# Patient Record
Sex: MALE | Race: WHITE | Employment: FULL TIME | URBAN - NONMETROPOLITAN AREA
[De-identification: names, ages, dates, MRNs, and addresses within clinical notes are randomized per-mention and may not be internally consistent; named-entity substitution may affect disease eponyms.]

---

## 2017-10-09 ENCOUNTER — HOSPITAL ENCOUNTER (OUTPATIENT)
Age: 27
Discharge: HOME | End: 2017-10-09
Attending: EMERGENCY MEDICINE | Admitting: SURGERY
Payer: COMMERCIAL

## 2017-10-09 ENCOUNTER — ANESTHESIA (OUTPATIENT)
Dept: OPERATING ROOM | Age: 27
End: 2017-10-09
Payer: COMMERCIAL

## 2017-10-09 ENCOUNTER — APPOINTMENT (OUTPATIENT)
Dept: CT IMAGING | Age: 27
End: 2017-10-09
Payer: COMMERCIAL

## 2017-10-09 ENCOUNTER — ANESTHESIA EVENT (OUTPATIENT)
Dept: OPERATING ROOM | Age: 27
End: 2017-10-09
Payer: COMMERCIAL

## 2017-10-09 VITALS — DIASTOLIC BLOOD PRESSURE: 50 MMHG | TEMPERATURE: 97.2 F | SYSTOLIC BLOOD PRESSURE: 100 MMHG | OXYGEN SATURATION: 98 %

## 2017-10-09 VITALS
SYSTOLIC BLOOD PRESSURE: 121 MMHG | HEART RATE: 66 BPM | HEIGHT: 67 IN | TEMPERATURE: 98.6 F | RESPIRATION RATE: 18 BRPM | OXYGEN SATURATION: 100 % | WEIGHT: 149.38 LBS | DIASTOLIC BLOOD PRESSURE: 74 MMHG | BODY MASS INDEX: 23.45 KG/M2

## 2017-10-09 DIAGNOSIS — K35.30 ACUTE APPENDICITIS WITH LOCALIZED PERITONITIS: Primary | ICD-10-CM

## 2017-10-09 LAB
ABSOLUTE EOS #: 0.4 K/UL (ref 0–0.4)
ABSOLUTE LYMPH #: 4.58 K/UL (ref 1–4.8)
ABSOLUTE MONO #: 0.8 K/UL (ref 0–1)
ALBUMIN SERPL-MCNC: 4.3 G/DL (ref 3.5–5.2)
ALBUMIN/GLOBULIN RATIO: 1.3 (ref 1–2.5)
ALP BLD-CCNC: 71 U/L (ref 40–129)
ALT SERPL-CCNC: 202 U/L (ref 5–41)
AMPHETAMINE SCREEN URINE: NEGATIVE
ANION GAP SERPL CALCULATED.3IONS-SCNC: 12 MMOL/L (ref 9–17)
AST SERPL-CCNC: 100 U/L
BARBITURATE SCREEN URINE: NEGATIVE
BASOPHILS # BLD: 0 %
BASOPHILS ABSOLUTE: 0 K/UL (ref 0–0.2)
BENZODIAZEPINE SCREEN, URINE: NEGATIVE
BILIRUB SERPL-MCNC: 0.3 MG/DL (ref 0.3–1.2)
BUN BLDV-MCNC: 12 MG/DL (ref 6–20)
BUN/CREAT BLD: 15 (ref 9–20)
BUPRENORPHINE URINE: NEGATIVE
CALCIUM SERPL-MCNC: 9.1 MG/DL (ref 8.6–10.4)
CANNABINOID SCREEN URINE: NEGATIVE
CHLORIDE BLD-SCNC: 99 MMOL/L (ref 98–107)
CO2: 28 MMOL/L (ref 20–31)
COCAINE METABOLITE, URINE: NEGATIVE
CREAT SERPL-MCNC: 0.78 MG/DL (ref 0.7–1.2)
DIFFERENTIAL TYPE: ABNORMAL
EOSINOPHILS RELATIVE PERCENT: 2 %
ETHANOL PERCENT: <0.01 %
ETHANOL: <10 MG/DL
GFR AFRICAN AMERICAN: >60 ML/MIN
GFR NON-AFRICAN AMERICAN: >60 ML/MIN
GFR SERPL CREATININE-BSD FRML MDRD: ABNORMAL ML/MIN/{1.73_M2}
GFR SERPL CREATININE-BSD FRML MDRD: ABNORMAL ML/MIN/{1.73_M2}
GLUCOSE BLD-MCNC: 107 MG/DL (ref 70–99)
HCT VFR BLD CALC: 50.1 % (ref 41–53)
HEMOGLOBIN: 16.3 G/DL (ref 13.5–17)
LIPASE: 81 U/L (ref 13–60)
LYMPHOCYTES # BLD: 23 %
MCH RBC QN AUTO: 30.3 PG (ref 26–34)
MCHC RBC AUTO-ENTMCNC: 32.6 G/DL (ref 31–37)
MCV RBC AUTO: 93.1 FL (ref 80–100)
MDMA URINE: NORMAL
METHADONE SCREEN, URINE: NEGATIVE
METHAMPHETAMINE, URINE: NEGATIVE
MONOCYTES # BLD: 4 %
MORPHOLOGY: NORMAL
OPIATES, URINE: NEGATIVE
OXYCODONE SCREEN URINE: NEGATIVE
PDW BLD-RTO: 13.6 % (ref 12.1–15.2)
PHENCYCLIDINE, URINE: NEGATIVE
PLATELET # BLD: 284 K/UL (ref 140–450)
PLATELET ESTIMATE: ABNORMAL
PMV BLD AUTO: 7.7 FL (ref 6–12)
POTASSIUM SERPL-SCNC: 4 MMOL/L (ref 3.7–5.3)
PROPOXYPHENE, URINE: NEGATIVE
RBC # BLD: 5.38 M/UL (ref 4.5–5.9)
RBC # BLD: ABNORMAL 10*6/UL
SEG NEUTROPHILS: 71 %
SEGMENTED NEUTROPHILS ABSOLUTE COUNT: 14.12 K/UL (ref 1.8–7.7)
SODIUM BLD-SCNC: 139 MMOL/L (ref 135–144)
TEST INFORMATION: NORMAL
TOTAL PROTEIN: 7.5 G/DL (ref 6.4–8.3)
TRICYCLIC ANTIDEPRESSANTS, UR: NEGATIVE
WBC # BLD: 19.9 K/UL (ref 3.5–11)
WBC # BLD: ABNORMAL 10*3/UL

## 2017-10-09 PROCEDURE — 80306 DRUG TEST PRSMV INSTRMNT: CPT

## 2017-10-09 PROCEDURE — 3700000001 HC ADD 15 MINUTES (ANESTHESIA): Performed by: SURGERY

## 2017-10-09 PROCEDURE — 6370000000 HC RX 637 (ALT 250 FOR IP): Performed by: EMERGENCY MEDICINE

## 2017-10-09 PROCEDURE — 83690 ASSAY OF LIPASE: CPT

## 2017-10-09 PROCEDURE — 2720000003 HC MISC SUTURE/STAPLES/RELOADS/ETC: Performed by: SURGERY

## 2017-10-09 PROCEDURE — 6360000002 HC RX W HCPCS: Performed by: EMERGENCY MEDICINE

## 2017-10-09 PROCEDURE — 2580000003 HC RX 258: Performed by: EMERGENCY MEDICINE

## 2017-10-09 PROCEDURE — 74177 CT ABD & PELVIS W/CONTRAST: CPT

## 2017-10-09 PROCEDURE — 3600000014 HC SURGERY LEVEL 4 ADDTL 15MIN: Performed by: SURGERY

## 2017-10-09 PROCEDURE — 7100000000 HC PACU RECOVERY - FIRST 15 MIN: Performed by: SURGERY

## 2017-10-09 PROCEDURE — 6360000002 HC RX W HCPCS: Performed by: SURGERY

## 2017-10-09 PROCEDURE — 7100000001 HC PACU RECOVERY - ADDTL 15 MIN: Performed by: SURGERY

## 2017-10-09 PROCEDURE — 80053 COMPREHEN METABOLIC PANEL: CPT

## 2017-10-09 PROCEDURE — 2500000003 HC RX 250 WO HCPCS

## 2017-10-09 PROCEDURE — 2500000003 HC RX 250 WO HCPCS: Performed by: NURSE ANESTHETIST, CERTIFIED REGISTERED

## 2017-10-09 PROCEDURE — 3600000004 HC SURGERY LEVEL 4 BASE: Performed by: SURGERY

## 2017-10-09 PROCEDURE — 85025 COMPLETE CBC W/AUTO DIFF WBC: CPT

## 2017-10-09 PROCEDURE — 99219 PR INITIAL OBSERVATION CARE/DAY 50 MINUTES: CPT | Performed by: SURGERY

## 2017-10-09 PROCEDURE — 99285 EMERGENCY DEPT VISIT HI MDM: CPT

## 2017-10-09 PROCEDURE — 96375 TX/PRO/DX INJ NEW DRUG ADDON: CPT

## 2017-10-09 PROCEDURE — 96365 THER/PROPH/DIAG IV INF INIT: CPT

## 2017-10-09 PROCEDURE — 6360000002 HC RX W HCPCS: Performed by: NURSE ANESTHETIST, CERTIFIED REGISTERED

## 2017-10-09 PROCEDURE — 6360000004 HC RX CONTRAST MEDICATION: Performed by: EMERGENCY MEDICINE

## 2017-10-09 PROCEDURE — 2720000010 HC SURG SUPPLY STERILE: Performed by: SURGERY

## 2017-10-09 PROCEDURE — 88304 TISSUE EXAM BY PATHOLOGIST: CPT

## 2017-10-09 PROCEDURE — 2580000003 HC RX 258: Performed by: SURGERY

## 2017-10-09 PROCEDURE — 3700000000 HC ANESTHESIA ATTENDED CARE: Performed by: SURGERY

## 2017-10-09 PROCEDURE — G0480 DRUG TEST DEF 1-7 CLASSES: HCPCS

## 2017-10-09 RX ORDER — IBUPROFEN 600 MG/1
600 TABLET ORAL EVERY 6 HOURS PRN
Status: DISCONTINUED | OUTPATIENT
Start: 2017-10-09 | End: 2017-10-09 | Stop reason: HOSPADM

## 2017-10-09 RX ORDER — SODIUM CHLORIDE, SODIUM LACTATE, POTASSIUM CHLORIDE, CALCIUM CHLORIDE 600; 310; 30; 20 MG/100ML; MG/100ML; MG/100ML; MG/100ML
INJECTION, SOLUTION INTRAVENOUS CONTINUOUS
Status: DISCONTINUED | OUTPATIENT
Start: 2017-10-09 | End: 2017-10-09

## 2017-10-09 RX ORDER — OXYCODONE HYDROCHLORIDE AND ACETAMINOPHEN 5; 325 MG/1; MG/1
2 TABLET ORAL PRN
Status: DISCONTINUED | OUTPATIENT
Start: 2017-10-09 | End: 2017-10-09 | Stop reason: HOSPADM

## 2017-10-09 RX ORDER — FENTANYL CITRATE 50 UG/ML
INJECTION, SOLUTION INTRAMUSCULAR; INTRAVENOUS PRN
Status: DISCONTINUED | OUTPATIENT
Start: 2017-10-09 | End: 2017-10-09 | Stop reason: SDUPTHER

## 2017-10-09 RX ORDER — SUCCINYLCHOLINE CHLORIDE 20 MG/ML
INJECTION INTRAMUSCULAR; INTRAVENOUS PRN
Status: DISCONTINUED | OUTPATIENT
Start: 2017-10-09 | End: 2017-10-09 | Stop reason: SDUPTHER

## 2017-10-09 RX ORDER — OXYCODONE HYDROCHLORIDE AND ACETAMINOPHEN 5; 325 MG/1; MG/1
1 TABLET ORAL PRN
Status: DISCONTINUED | OUTPATIENT
Start: 2017-10-09 | End: 2017-10-09 | Stop reason: HOSPADM

## 2017-10-09 RX ORDER — ACETAMINOPHEN 325 MG/1
650 TABLET ORAL EVERY 6 HOURS PRN
Status: DISCONTINUED | OUTPATIENT
Start: 2017-10-09 | End: 2017-10-09 | Stop reason: HOSPADM

## 2017-10-09 RX ORDER — FENTANYL CITRATE 50 UG/ML
50 INJECTION, SOLUTION INTRAMUSCULAR; INTRAVENOUS EVERY 5 MIN PRN
Status: DISCONTINUED | OUTPATIENT
Start: 2017-10-09 | End: 2017-10-09 | Stop reason: HOSPADM

## 2017-10-09 RX ORDER — GLYCOPYRROLATE 0.2 MG/ML
INJECTION INTRAMUSCULAR; INTRAVENOUS PRN
Status: DISCONTINUED | OUTPATIENT
Start: 2017-10-09 | End: 2017-10-09 | Stop reason: SDUPTHER

## 2017-10-09 RX ORDER — DICYCLOMINE HYDROCHLORIDE 10 MG/1
20 CAPSULE ORAL ONCE
Status: COMPLETED | OUTPATIENT
Start: 2017-10-09 | End: 2017-10-09

## 2017-10-09 RX ORDER — SODIUM CHLORIDE 9 MG/ML
INJECTION, SOLUTION INTRAVENOUS CONTINUOUS
Status: DISCONTINUED | OUTPATIENT
Start: 2017-10-09 | End: 2017-10-09

## 2017-10-09 RX ORDER — MEPERIDINE HYDROCHLORIDE 50 MG/ML
12.5 INJECTION INTRAMUSCULAR; INTRAVENOUS; SUBCUTANEOUS EVERY 5 MIN PRN
Status: DISCONTINUED | OUTPATIENT
Start: 2017-10-09 | End: 2017-10-09 | Stop reason: HOSPADM

## 2017-10-09 RX ORDER — KETOROLAC TROMETHAMINE 10 MG/1
10 TABLET, FILM COATED ORAL EVERY 6 HOURS PRN
Qty: 30 TABLET | Refills: 0 | Status: SHIPPED | OUTPATIENT
Start: 2017-10-09 | End: 2018-10-09

## 2017-10-09 RX ORDER — KETOROLAC TROMETHAMINE 30 MG/ML
30 INJECTION, SOLUTION INTRAMUSCULAR; INTRAVENOUS EVERY 6 HOURS PRN
Status: DISCONTINUED | OUTPATIENT
Start: 2017-10-09 | End: 2017-10-09 | Stop reason: HOSPADM

## 2017-10-09 RX ORDER — MIDAZOLAM HYDROCHLORIDE 1 MG/ML
2 INJECTION INTRAMUSCULAR; INTRAVENOUS ONCE
Status: COMPLETED | OUTPATIENT
Start: 2017-10-09 | End: 2017-10-09

## 2017-10-09 RX ORDER — ONDANSETRON 2 MG/ML
4 INJECTION INTRAMUSCULAR; INTRAVENOUS
Status: DISCONTINUED | OUTPATIENT
Start: 2017-10-09 | End: 2017-10-09 | Stop reason: HOSPADM

## 2017-10-09 RX ORDER — ONDANSETRON 4 MG/1
4 TABLET, ORALLY DISINTEGRATING ORAL EVERY 8 HOURS PRN
Qty: 30 TABLET | Refills: 0 | Status: SHIPPED | OUTPATIENT
Start: 2017-10-09

## 2017-10-09 RX ORDER — IBUPROFEN 200 MG
600 TABLET ORAL EVERY 6 HOURS PRN
COMMUNITY

## 2017-10-09 RX ORDER — ROCURONIUM BROMIDE 10 MG/ML
INJECTION, SOLUTION INTRAVENOUS PRN
Status: DISCONTINUED | OUTPATIENT
Start: 2017-10-09 | End: 2017-10-09 | Stop reason: SDUPTHER

## 2017-10-09 RX ORDER — 0.9 % SODIUM CHLORIDE 0.9 %
1000 INTRAVENOUS SOLUTION INTRAVENOUS ONCE
Status: COMPLETED | OUTPATIENT
Start: 2017-10-09 | End: 2017-10-09

## 2017-10-09 RX ORDER — METOCLOPRAMIDE HYDROCHLORIDE 5 MG/ML
10 INJECTION INTRAMUSCULAR; INTRAVENOUS
Status: DISCONTINUED | OUTPATIENT
Start: 2017-10-09 | End: 2017-10-09 | Stop reason: HOSPADM

## 2017-10-09 RX ORDER — PROPOFOL 10 MG/ML
INJECTION, EMULSION INTRAVENOUS PRN
Status: DISCONTINUED | OUTPATIENT
Start: 2017-10-09 | End: 2017-10-09 | Stop reason: SDUPTHER

## 2017-10-09 RX ORDER — ONDANSETRON 2 MG/ML
4 INJECTION INTRAMUSCULAR; INTRAVENOUS ONCE
Status: COMPLETED | OUTPATIENT
Start: 2017-10-09 | End: 2017-10-09

## 2017-10-09 RX ORDER — ONDANSETRON 2 MG/ML
INJECTION INTRAMUSCULAR; INTRAVENOUS PRN
Status: DISCONTINUED | OUTPATIENT
Start: 2017-10-09 | End: 2017-10-09 | Stop reason: SDUPTHER

## 2017-10-09 RX ORDER — ACETAMINOPHEN 10 MG/ML
INJECTION, SOLUTION INTRAVENOUS PRN
Status: DISCONTINUED | OUTPATIENT
Start: 2017-10-09 | End: 2017-10-09 | Stop reason: SDUPTHER

## 2017-10-09 RX ORDER — LIDOCAINE HYDROCHLORIDE 20 MG/ML
INJECTION, SOLUTION INFILTRATION; PERINEURAL PRN
Status: DISCONTINUED | OUTPATIENT
Start: 2017-10-09 | End: 2017-10-09 | Stop reason: SDUPTHER

## 2017-10-09 RX ORDER — KETOROLAC TROMETHAMINE 30 MG/ML
INJECTION, SOLUTION INTRAMUSCULAR; INTRAVENOUS PRN
Status: DISCONTINUED | OUTPATIENT
Start: 2017-10-09 | End: 2017-10-09 | Stop reason: SDUPTHER

## 2017-10-09 RX ADMIN — PIPERACILLIN SODIUM,TAZOBACTAM SODIUM 3.38 G: 3; .375 INJECTION, POWDER, FOR SOLUTION INTRAVENOUS at 05:59

## 2017-10-09 RX ADMIN — SODIUM CHLORIDE, POTASSIUM CHLORIDE, SODIUM LACTATE AND CALCIUM CHLORIDE 125 ML/HR: 600; 310; 30; 20 INJECTION, SOLUTION INTRAVENOUS at 06:29

## 2017-10-09 RX ADMIN — PIPERACILLIN SODIUM,TAZOBACTAM SODIUM 3.38 G: 3; .375 INJECTION, POWDER, FOR SOLUTION INTRAVENOUS at 17:46

## 2017-10-09 RX ADMIN — MIDAZOLAM HYDROCHLORIDE 2 MG: 2 INJECTION, SOLUTION INTRAMUSCULAR; INTRAVENOUS at 07:29

## 2017-10-09 RX ADMIN — DICYCLOMINE HYDROCHLORIDE 20 MG: 10 CAPSULE ORAL at 04:47

## 2017-10-09 RX ADMIN — GLYCOPYRROLATE 0.4 MG: 0.2 INJECTION INTRAMUSCULAR; INTRAVENOUS at 08:47

## 2017-10-09 RX ADMIN — IOPAMIDOL 100 ML: 612 INJECTION, SOLUTION INTRAVENOUS at 05:15

## 2017-10-09 RX ADMIN — SUCCINYLCHOLINE CHLORIDE 100 MG: 20 INJECTION, SOLUTION INTRAMUSCULAR; INTRAVENOUS at 07:54

## 2017-10-09 RX ADMIN — PROPOFOL 200 MG: 10 INJECTION, EMULSION INTRAVENOUS at 07:54

## 2017-10-09 RX ADMIN — ONDANSETRON 4 MG: 2 INJECTION INTRAMUSCULAR; INTRAVENOUS at 04:45

## 2017-10-09 RX ADMIN — PIPERACILLIN SODIUM,TAZOBACTAM SODIUM 3.38 G: 3; .375 INJECTION, POWDER, FOR SOLUTION INTRAVENOUS at 12:16

## 2017-10-09 RX ADMIN — ONDANSETRON 4 MG: 2 INJECTION INTRAMUSCULAR; INTRAVENOUS at 08:47

## 2017-10-09 RX ADMIN — KETOROLAC TROMETHAMINE 30 MG: 30 INJECTION, SOLUTION INTRAMUSCULAR at 12:59

## 2017-10-09 RX ADMIN — LIDOCAINE HYDROCHLORIDE 100 MG: 20 INJECTION, SOLUTION INFILTRATION; PERINEURAL at 07:54

## 2017-10-09 RX ADMIN — Medication 25 MG: at 08:05

## 2017-10-09 RX ADMIN — NEOSTIGMINE METHYLSULFATE 2 MG: 1 INJECTION, SOLUTION INTRAMUSCULAR; INTRAVENOUS; SUBCUTANEOUS at 08:47

## 2017-10-09 RX ADMIN — KETOROLAC TROMETHAMINE 30 MG: 30 INJECTION, SOLUTION INTRAMUSCULAR; INTRAVENOUS at 08:47

## 2017-10-09 RX ADMIN — SODIUM CHLORIDE 1000 ML: 9 INJECTION, SOLUTION INTRAVENOUS at 04:43

## 2017-10-09 RX ADMIN — ACETAMINOPHEN 1000 MG: 10 INJECTION, SOLUTION INTRAVENOUS at 08:10

## 2017-10-09 RX ADMIN — FENTANYL CITRATE 100 MCG: 50 INJECTION INTRAMUSCULAR; INTRAVENOUS at 07:54

## 2017-10-09 ASSESSMENT — ENCOUNTER SYMPTOMS
EYE REDNESS: 0
CHEST TIGHTNESS: 0
RESPIRATORY NEGATIVE: 1
SINUS PRESSURE: 0
VOMITING: 0
EYE PAIN: 0
FACIAL SWELLING: 0
WHEEZING: 0
DIARRHEA: 1
SHORTNESS OF BREATH: 0
CONSTIPATION: 0
NAUSEA: 1
BLOOD IN STOOL: 0
ABDOMINAL PAIN: 1
COUGH: 0
EYES NEGATIVE: 1

## 2017-10-09 ASSESSMENT — PAIN SCALES - GENERAL
PAINLEVEL_OUTOF10: 2
PAINLEVEL_OUTOF10: 2
PAINLEVEL_OUTOF10: 6
PAINLEVEL_OUTOF10: 3
PAINLEVEL_OUTOF10: 1
PAINLEVEL_OUTOF10: 0
PAINLEVEL_OUTOF10: 3
PAINLEVEL_OUTOF10: 0
PAINLEVEL_OUTOF10: 2
PAINLEVEL_OUTOF10: 9
PAINLEVEL_OUTOF10: 1

## 2017-10-09 ASSESSMENT — PAIN DESCRIPTION - LOCATION
LOCATION: ABDOMEN

## 2017-10-09 ASSESSMENT — PAIN DESCRIPTION - FREQUENCY
FREQUENCY: INTERMITTENT
FREQUENCY: INTERMITTENT

## 2017-10-09 ASSESSMENT — PAIN DESCRIPTION - ORIENTATION
ORIENTATION: LOWER
ORIENTATION: MID
ORIENTATION: LOWER

## 2017-10-09 ASSESSMENT — PAIN DESCRIPTION - PAIN TYPE
TYPE: SURGICAL PAIN
TYPE: SURGICAL PAIN
TYPE: ACUTE PAIN
TYPE: SURGICAL PAIN

## 2017-10-09 ASSESSMENT — PAIN DESCRIPTION - DESCRIPTORS
DESCRIPTORS: HEAVINESS;DISCOMFORT
DESCRIPTORS: ACHING
DESCRIPTORS: SHARP;STABBING

## 2017-10-09 ASSESSMENT — PAIN DESCRIPTION - ONSET
ONSET: ON-GOING
ONSET: ON-GOING

## 2017-10-09 NOTE — ANESTHESIA POSTPROCEDURE EVALUATION
Department of Anesthesiology  Postprocedure Note    Patient: Ruth Perry  MRN: [id number]  [place]: 1990  Date of evaluation: 10/9/2017  Time:  9:40 AM     Procedure Summary     Date:  10/09/17 Room / Location:  [address] [place] [address]    Anesthesia Start:  0749 Anesthesia Stop:  9628    Procedure:  APPENDECTOMY LAPAROSCOPIC (N/A ) Diagnosis:  (ABD PAIN)    Surgeon:  Dario Giron DO Responsible Provider:  Daniel Alex CRNA    Anesthesia Type:  general, regional ASA Status:  Not recorded          Anesthesia Type: general, regional    Len Phase I: Len Score: 10    Len Phase II:      Last vitals: Reviewed and per EMR flowsheets.        Anesthesia Post Evaluation    Patient location during evaluation: PACU  Patient participation: complete - patient participated  Level of consciousness: awake  Pain score: 0  Airway patency: patent  Nausea & Vomiting: no nausea and no vomiting  Complications: no  Cardiovascular status: blood pressure returned to baseline  Respiratory status: acceptable  Hydration status: euvolemic

## 2017-10-09 NOTE — PROGRESS NOTES
Discharge instructions given to pt and significant other. No questions. Pt aware to make follow up appointment as instructed on AVS. Pt d/c'd off unit at this time, ambulatory with significant other to home. Belongings in hand. No issues noted.

## 2017-10-09 NOTE — ED PROVIDER NOTES
[facility] ED  eMERGENCY dEPARTMENT eNCOUnter      Pt Name: Alla Coronado  MRN: [id number]  [place] 1990  Date of evaluation: 10/9/2017  Provider: Benitez Mcgill MD    CHIEF COMPLAINT       Chief Complaint   Patient presents with    Abdominal Pain     mid abdomen, onset 3 hours ago       [facility]    Alla Coronado is a 32 y.o. male who presents to the emergency department from home For epigastric pain that began this evening. States began 3 hours ago while he was laying down. The pain is sharp and stabbing. It does not radiate. Associated with nausea and diarrhea. Has had 4 episodes of watery diarrhea. No sick contacts. Has not taken any medications for pain. Patient denies fever, chills, headache, visual changes, neck pain, chest pain, lightheadedness, shortness of breath, or dysuria. Triage notes and Nursing notes were reviewed by myself. Any discrepancies are addressed above. PAST MEDICAL HISTORY     Past Medical History:   Diagnosis Date    Asthma        SURGICAL HISTORY     History reviewed. No pertinent surgical history. CURRENT MEDICATIONS       Previous Medications    ALBUTEROL SULFATE  (90 BASE) MCG/ACT INHALER    Inhale 2 puffs into the lungs every 6 hours as needed for Wheezing or Shortness of Breath    IBUPROFEN (ADVIL;MOTRIN) 200 MG TABLET    Take 600 mg by mouth every 6 hours as needed for Pain    OMEPRAZOLE (PRILOSEC) 20 MG CAPSULE    Take 1 capsule by mouth every 12 hours for 14 days       ALLERGIES     Review of patient's allergies indicates no known allergies. FAMILY HISTORY     History reviewed. No pertinent family history.      SOCIAL HISTORY       Social History     Social History    Marital status: Single     Spouse name: N/A    Number of children: N/A    Years of education: N/A     Social History Main Topics    Smoking status: Current Every Day Smoker     Packs/day: 1.00     Types: Cigarettes    Smokeless tobacco: Never Used    Alcohol use No    Drug use: No      Comment: former herion user    Sexual activity: Not Asked     Other Topics Concern    None     Social History Narrative    None       REVIEW OF SYSTEMS       Review of Systems   Constitutional: Negative. Negative for chills and fever. HENT: Negative. Negative for congestion, facial swelling and sinus pressure. Eyes: Negative. Negative for pain and redness. Respiratory: Negative. Negative for cough, chest tightness, shortness of breath and wheezing. Cardiovascular: Negative. Negative for chest pain and leg swelling. Gastrointestinal: Positive for abdominal pain, diarrhea and nausea. Negative for blood in stool, constipation and vomiting. Genitourinary: Negative. Negative for dysuria and hematuria. Musculoskeletal: Negative. Negative for arthralgias and neck pain. Skin: Negative. Negative for pallor and wound. Neurological: Negative. Negative for dizziness, light-headedness, numbness and headaches. Psychiatric/Behavioral: Negative. Negative for suicidal ideas. All other systems reviewed and are negative. Except as noted above the remainder of the review of systems was reviewed and is negative. PHYSICAL EXAM    (up to 7 for level 4, 8 or more for level 5)     ED Triage Vitals [10/09/17 0417]   BP Temp Temp Source Pulse Resp SpO2 Height Weight   133/79 98.6 °F (37 °C) Tympanic 81 18 99 % 5' 7\" (1.702 m) 145 lb (65.8 kg)       Physical Exam   Constitutional: He is oriented to person, place, and time. He appears well-developed and well-nourished. No distress. HENT:   Head: Normocephalic and atraumatic. Mouth/Throat: Oropharynx is clear and moist.   Eyes: Conjunctivae and EOM are normal.   Neck: Normal range of motion. Neck supple. Cardiovascular: Normal rate, regular rhythm and normal heart sounds. No murmur heard. Pulmonary/Chest: Effort normal and breath sounds normal. No respiratory distress. He has no wheezes.  He exhibits no tenderness. Abdominal: Soft. Bowel sounds are normal. There is tenderness (Epigastric tenderness to palpation. Mild involuntary guarding. Negative Hilario's. No right lower quadrant pain. ). There is guarding. There is no rebound. Musculoskeletal: Normal range of motion. He exhibits no edema, tenderness or deformity. Neurological: He is alert and oriented to person, place, and time. No cranial nerve deficit. No focal neurological deficits   Skin: Skin is warm. Nursing note and vitals reviewed. DIAGNOSTIC RESULTS     EKG: (none if blank)  All EKG's are interpreted by the Emergency Department Physician who either signs or Co-signs this chart in the absence of a cardiologist.        RADIOLOGY: (none if blank)   Interpretation per the Radiologist below, if available at the time of this note:    CT ABDOMEN PELVIS W IV CONTRAST Additional Contrast? None   Final Result   Impression:     Acute appendicitis without evidence of perforation. Dr. Rhonda Brumfield discussed findings with Nurse Lazara Duran at 4514 CST on 10/09/2017 immediately following the examination. Electronically Signed By: Ritesh Patterson   on  10/09/2017  05:35          LABS:  Labs Reviewed   CBC WITH AUTO DIFFERENTIAL - Abnormal; Notable for the following:        Result Value    WBC 19.9 (*)     Segs Absolute 14.12 (*)     All other components within normal limits   COMPREHENSIVE METABOLIC PANEL - Abnormal; Notable for the following:     Glucose 107 (*)      (*)      (*)     All other components within normal limits   LIPASE - Abnormal; Notable for the following:     Lipase 81 (*)     All other components within normal limits   ETHANOL   URINE DRUG SCREEN       All other labs were within normal range or not returned as of this dictation. EMERGENCY DEPARTMENT COURSE and Medical Decision Making:     MDM/  ED Course      The patient presents with epigastric abdominal pain.  The patient is afebrile, nontoxic, and

## 2017-10-09 NOTE — OP NOTE
[address], [place] [address]                               OPERATIVE REPORT    PATIENT NAME: Allan Castrejon                        :             1990  MED REC NO:   [id number]                               ROOM:            0320  ACCOUNT NO:   [de-identified]                            ADMISSION DATE:  10/09/2017  PROVIDER:     Rosalva Earing    DATE OF PROCEDURE:  10/09/2017    PREOPERATIVE DIAGNOSIS:  Acute appendicitis. POSTOPERATIVE DIAGNOSIS:  Acute appendicitis. OPERATION PERFORMED:  Laparoscopic appendectomy. SURGEON:  Dr. Jose Nations:  General.    ESTIMATED BLOOD LOSS:  Minimal.    COMPLICATIONS:  None. SPECIMEN:  Appendix. FINDINGS:  An inflamed appendix. INDICATIONS:  This is a 55-year-old male who presented to the  emergency room with less than 12 hours worth of periumbilical pain. CT scan obtained, which showed acute appendicitis. Consent was  obtained for laparoscopic appendectomy with risks, benefits, and  alternatives to procedure were explained. OPERATIVE PROCEDURE:  The patient was brought into the operating room  table, laid in the supine position, perioperative antibiotics and SCDs  placed. Surgical pause performed. Appropriate anesthesia was  administered. A TAP block was administered by Anesthesia. The  abdomen was prepped and draped in the usual fashion with ChloraPrep. An incision was made periumbilical to insert a 38-PD port, 15 mmHg  insufflation. Two other 5-mm ports were placed under direct  visualization. The appendix was identified. LigaSure was used to  take down the mesoappendix and the appendix was taken down with Blue  load Endo TERRENCE and removed via the 10-mm port via an Endo Catch bag. Hemostasis was satisfactory. Ports were removed under direct  visualization. The 10-mm port fascia was closed with a  figure-of-eight Vicryl suture.   The skin was

## 2017-10-09 NOTE — ANESTHESIA PRE PROCEDURE
Department of Anesthesiology  Preprocedure Note       Name:  Ruth Perry   Age:  32 y.o.  :  1990                                          MRN:  [id number]         Date:  10/9/2017      Surgeon: Rebeca Wilson):  Saima Nguyen DO    Procedure: Procedure(s):  APPENDECTOMY LAPAROSCOPIC    Medications prior to admission:   Prior to Admission medications    Medication Sig Start Date End Date Taking? Authorizing Provider   ibuprofen (ADVIL;MOTRIN) 200 MG tablet Take 600 mg by mouth every 6 hours as needed for Pain   Yes Historical Provider, MD   albuterol sulfate  (90 BASE) MCG/ACT inhaler Inhale 2 puffs into the lungs every 6 hours as needed for Wheezing or Shortness of Breath    Historical Provider, MD   omeprazole (PRILOSEC) 20 MG capsule Take 1 capsule by mouth every 12 hours for 14 days 12/11/15 12/25/15  Tere Torres DO       Current medications:    Current Facility-Administered Medications   Medication Dose Route Frequency Provider Last Rate Last Dose    [MAR Hold] lactated ringers infusion   Intravenous Continuous Usman Lorenz  mL/hr at 10/09/17 0629 125 mL/hr at 10/09/17 06       Allergies:  No Known Allergies    Problem List:  There is no problem list on file for this patient. Past Medical History:        Diagnosis Date    Asthma        Past Surgical History:  History reviewed. No pertinent surgical history.     Social History:    Social History   Substance Use Topics    Smoking status: Current Every Day Smoker     Packs/day: 1.00     Types: Cigarettes    Smokeless tobacco: Never Used    Alcohol use No                                Ready to quit: Not Answered  Counseling given: Not Answered      Vital Signs (Current):   Vitals:    10/09/17 0542 10/09/17 0607 10/09/17 0621 10/09/17 0641   BP: 107/62 (!) 109/59 108/62 (!) 111/59   Pulse:       Resp:       Temp:       TempSrc:       SpO2:       Weight:       Height:                                                  BP Readings from Last 3 Encounters:   10/09/17 (!) 111/59   12/11/15 136/82   09/04/15 118/71       NPO Status: Time of last liquid consumption: 0330                        Time of last solid consumption: 2100                        Date of last liquid consumption: 10/09/17                        Date of last solid food consumption: 10/08/17    BMI:   Wt Readings from Last 3 Encounters:   10/09/17 145 lb (65.8 kg)   12/11/15 154 lb 9 oz (70.1 kg)   09/04/15 140 lb (63.5 kg)     Body mass index is 22.71 kg/m². CBC:   Lab Results   Component Value Date    WBC 19.9 10/09/2017    RBC 5.38 10/09/2017    HGB 16.3 10/09/2017    HCT 50.1 10/09/2017    MCV 93.1 10/09/2017    RDW 13.6 10/09/2017     10/09/2017       CMP:   Lab Results   Component Value Date     10/09/2017    K 4.0 10/09/2017    CL 99 10/09/2017    CO2 28 10/09/2017    BUN 12 10/09/2017    CREATININE 0.78 10/09/2017    GFRAA >60 10/09/2017    LABGLOM >60 10/09/2017    GLUCOSE 107 10/09/2017    PROT 7.5 10/09/2017    CALCIUM 9.1 10/09/2017    BILITOT 0.30 10/09/2017    ALKPHOS 71 10/09/2017     10/09/2017     10/09/2017       POC Tests: No results for input(s): POCGLU, POCNA, POCK, POCCL, POCBUN, POCHEMO, POCHCT in the last 72 hours.     Coags: No results found for: PROTIME, INR, APTT    HCG (If Applicable): No results found for: PREGTESTUR, PREGSERUM, HCG, HCGQUANT     ABGs: No results found for: PHART, PO2ART, ODN0TAM, RCM6NFJ, BEART, K9WTZVLT     Type & Screen (If Applicable):  No results found for: LABABO, LABRH    Anesthesia Evaluation    Airway: Mallampati: II  TM distance: >3 FB   Neck ROM: full  Mouth opening: > = 3 FB Dental:          Pulmonary: breath sounds clear to auscultation  (+) asthma:                            Cardiovascular:            Rhythm: regular  Rate: normal           Beta Blocker:  Not on Beta Blocker         Neuro/Psych:               GI/Hepatic/Renal:             Endo/Other:                     Abdominal:       Abdomen: soft.    Vascular:                                    Anesthesia Plan      general and regional     ASA 2 - emergent       Induction: intravenous. MIPS: Postoperative opioids intended and Prophylactic antiemetics administered. Anesthetic plan and risks discussed with patient. Plan discussed with CRNA.                   Frederick Sandoval CRNA   42/7/1428

## 2017-10-09 NOTE — H&P
Surgical H&P  10/9/2017 7:01 AM    PCP: No primary care provider on file. HPI  27M  Presented with periumbilical abdominal pain <12hours. Pain is moderate and associated with nausea/emesis/diarrhea and described as crampy. CT shows acute appendicitis. Past Medical History:        Diagnosis Date    Asthma        Past Surgical History:    History reviewed. No pertinent surgical history. Medications:    Prior to Admission medications    Medication Sig Start Date End Date Taking? Authorizing Provider   ibuprofen (ADVIL;MOTRIN) 200 MG tablet Take 600 mg by mouth every 6 hours as needed for Pain   Yes Historical Provider, MD   albuterol sulfate  (90 BASE) MCG/ACT inhaler Inhale 2 puffs into the lungs every 6 hours as needed for Wheezing or Shortness of Breath    Historical Provider, MD   omeprazole (PRILOSEC) 20 MG capsule Take 1 capsule by mouth every 12 hours for 14 days 12/11/15 12/25/15  Reynaldo Robb DO       Allergies:  Review of patient's allergies indicates no known allergies. Social History:   TOBACCO:   reports that he has been smoking Cigarettes. He has been smoking about 1.00 pack per day. He has never used smokeless tobacco.  ETOH:   reports that he does not drink alcohol. Family History:   History reviewed. No pertinent family history.       ROS  Constitutional:  Denies fever or chills   Eyes:  Denies change in visual acuity   HENT:  Denies nasal congestion or sore throat   Respiratory:  Denies cough or shortness of breath   Cardiovascular:  Denies chest pain or edema   GI:  See HPI  :  Denies dysuria   Musculoskeletal:  Denies back pain or joint pain   Integument:  Denies rash   Neurologic:  Denies headache, focal weakness or sensory changes   Endocrine:  Denies polyuria or polydipsia   Lymphatic:  Denies swollen glands   Psychiatric:  Denies depression or anxiety       Physical Exam  BP (!) 111/59   Pulse 81   Temp 98.6 °F (37 °C) (Tympanic)   Resp 18   Ht 5' 7\" (1.702 intrathoracic contents are remarkable for right basilar linear atelectasis/scarring.        The liver, gallbladder, pancreas, spleen, and adrenal glands are unremarkable.        Kidneys show no worrisome lesions, hydronephrosis, or calculi. Urinary bladder is unremarkable.        Small and large bowel are normal in caliber. Appendix measures up to 8 millimeters in caliber, is fluid-filled, and demonstrates periappendiceal stranding and edema. No free air, free fluid, or lymphadenopathy identified.       Aorta is normal in course and caliber.       Superficial soft tissues are unremarkable.  No acute or aggressive appearing skeletal findings.           Impression   Impression:     Acute appendicitis without evidence of perforation.        Dr. Kwaku Nix discussed findings with Nurse Kelsey Lyon at 5754 CST on 10/09/2017 immediately following the examination.           Electronically Signed By: Bishop Hook   on  10/09/2017  05:35         Assessment and Plan  Acute appendicitis- laparoscopic appendectomy. Risk/benefits/alternatives explained.     Dr. Javier Pierce

## 2017-10-09 NOTE — ANESTHESIA PROCEDURE NOTES
Peripheral Block    Patient location during procedure: OR  Start time: 10/9/2017 7:58 AM  End time: 10/9/2017 8:02 AM  Staffing  Resident/CRNA: Rebeca BHAT  Preanesthetic Checklist  Completed: patient identified, surgical consent, pre-op evaluation, timeout performed, IV checked, risks and benefits discussed, monitors and equipment checked, anesthesia consent given, oxygen available and patient being monitored  Peripheral Block  Patient position: supine  Prep: ChloraPrep  Patient monitoring: cardiac monitor, continuous pulse ox, continuous capnometry, frequent blood pressure checks and IV access  Block type: TAP  Laterality: bilateral  Injection technique: single-shot  Procedures: ultrasound guided  Local infiltration: ropivacaine (decardron 10mg total)  Infiltration strength: 0.5 %  Dose: 40 mL  Provider prep: mask and sterile gloves  Local infiltration: ropivacaine (decardron 10mg total)  Needle  Needle type: pajunk 80mm TAP.    Assessment  Injection assessment: negative aspiration for heme, no paresthesia on injection and local visualized surrounding nerve on ultrasound  Paresthesia pain: none  Slow fractionated injection: yes  Hemodynamics: stable  Reason for block: post-op pain management and at surgeon's request

## 2017-10-09 NOTE — BRIEF OP NOTE
Brief Postoperative Note    Татьяна Beck  YOB: 1990  [id number]    Pre-operative Diagnosis: acute appendicitis    Post-operative Diagnosis: Same    Procedure: laparoscopic appendectomy    Anesthesia: General    Surgeons/Assistants: Wendy    Estimated Blood Loss: less than 50     Complications: None    Specimens: Was Obtained: appendix    Findings: inflamed appendix    Electronically signed by Oliver Calderon DO on 10/9/2017 at 9:01 AM  [id number]

## 2017-10-10 LAB — SURGICAL PATHOLOGY REPORT: NORMAL

## 2017-10-17 ENCOUNTER — OFFICE VISIT (OUTPATIENT)
Dept: SURGERY | Age: 27
End: 2017-10-17

## 2017-10-17 VITALS
HEIGHT: 67 IN | WEIGHT: 151.1 LBS | DIASTOLIC BLOOD PRESSURE: 83 MMHG | TEMPERATURE: 99.1 F | RESPIRATION RATE: 16 BRPM | HEART RATE: 88 BPM | BODY MASS INDEX: 23.72 KG/M2 | SYSTOLIC BLOOD PRESSURE: 134 MMHG

## 2017-10-17 DIAGNOSIS — Z09 POSTOP CHECK: Primary | ICD-10-CM

## 2017-10-17 PROCEDURE — 99024 POSTOP FOLLOW-UP VISIT: CPT | Performed by: SURGERY

## 2017-10-17 NOTE — PROGRESS NOTES
incisions and incisions are clean, dry and intact      Labs and Studies Reviewed  Lab Results   Component Value Date    WBC 19.9 (H) 10/09/2017    HGB 16.3 10/09/2017    HCT 50.1 10/09/2017    MCV 93.1 10/09/2017     10/09/2017     Lab Results   Component Value Date     10/09/2017    K 4.0 10/09/2017    CL 99 10/09/2017    CO2 28 10/09/2017    BUN 12 10/09/2017    CREATININE 0.78 10/09/2017    GLUCOSE 107 (H) 10/09/2017    CALCIUM 9.1 10/09/2017    PROT 7.5 10/09/2017    LABALBU 4.3 10/09/2017    BILITOT 0.30 10/09/2017    ALKPHOS 71 10/09/2017     (H) 10/09/2017     (H) 10/09/2017    LABGLOM >60 10/09/2017    GFRAA >60 10/09/2017             Assessment and Plan  Normal postop- return in 1mo to clear for work    Dr. Sylvie Sarmiento

## 2017-10-31 ENCOUNTER — OFFICE VISIT (OUTPATIENT)
Dept: SURGERY | Age: 27
End: 2017-10-31

## 2017-10-31 VITALS
BODY MASS INDEX: 23.28 KG/M2 | SYSTOLIC BLOOD PRESSURE: 134 MMHG | TEMPERATURE: 97.8 F | RESPIRATION RATE: 20 BRPM | HEART RATE: 73 BPM | HEIGHT: 67 IN | WEIGHT: 148.3 LBS | DIASTOLIC BLOOD PRESSURE: 82 MMHG

## 2017-10-31 DIAGNOSIS — Z09 POSTOP CHECK: Primary | ICD-10-CM

## 2017-10-31 PROCEDURE — 99024 POSTOP FOLLOW-UP VISIT: CPT | Performed by: SURGERY

## 2017-10-31 NOTE — PROGRESS NOTES
Surgical Office Visit  10/31/2017 10:16 AM    PCP: No primary care provider on file. HPI  Doing well post op of lap appendectomy 1mo ago. Denies pain      Past Medical History:        Diagnosis Date    Asthma        Past Surgical History:        Procedure Laterality Date    APPENDECTOMY      LAPAROSCOPIC APPENDECTOMY N/A 10/9/2017    APPENDECTOMY LAPAROSCOPIC performed by Beka Matson DO at [address]       Medications:    Prior to Admission medications    Medication Sig Start Date End Date Taking? Authorizing Provider   ibuprofen (ADVIL;MOTRIN) 200 MG tablet Take 600 mg by mouth every 6 hours as needed for Pain    Historical Provider, MD   ketorolac (TORADOL) 10 MG tablet Take 1 tablet by mouth every 6 hours as needed for Pain 10/9/17 10/9/18  Saima Nguyen DO   ondansetron (ZOFRAN ODT) 4 MG disintegrating tablet Take 1 tablet by mouth every 8 hours as needed for Nausea or Vomiting 10/9/17   Saima Nguyen DO   albuterol sulfate  (90 BASE) MCG/ACT inhaler Inhale 2 puffs into the lungs every 6 hours as needed for Wheezing or Shortness of Breath    Historical Provider, MD   omeprazole (PRILOSEC) 20 MG capsule Take 1 capsule by mouth every 12 hours for 14 days 12/11/15 12/25/15  Reynaldo Robb DO       Allergies:  Neurontin [gabapentin]    Social History:   TOBACCO:   reports that he has been smoking Cigarettes. He has been smoking about 1.00 pack per day. He has never used smokeless tobacco.  ETOH:   reports that he does not drink alcohol. Family History:   History reviewed. No pertinent family history.       ROS  No fevers or chills  No chest pain or dyspnea  No nausea, vomiting or diarrhea  No dysuria      Physical  /82 (Site: Right Arm, Position: Sitting, Cuff Size: Medium Adult)   Pulse 73   Temp 97.8 °F (36.6 °C) (Tympanic)   Resp 20   Ht 5' 7\" (1.702 m)   Wt 148 lb 4.8 oz (67.3 kg)   BMI 23.23 kg/m²   Abdomen is soft not tender and incisions are clean, dry and

## (undated) DEVICE — GOWN,AURORA,NON-REINFORCED,2XL: Brand: MEDLINE

## (undated) DEVICE — GLOVE SURG SZ 7 L12IN FNGR THK87MIL WHT LTX FREE

## (undated) DEVICE — SYRINGE, LUER LOCK, 10ML: Brand: MEDLINE

## (undated) DEVICE — DISCONTINUED USE 159256 OR 369965 RELOAD STAPLER 35MM ENDOPATH TI VASC

## (undated) DEVICE — APPLIER CLP M/L SHFT DIA5MM 15 LIG LIGAMAX 5

## (undated) DEVICE — TROCAR: Brand: KII FIOS FIRST ENTRY

## (undated) DEVICE — BAG SPEC REM 224ML W4XL6IN DIA10MM 1 HND GYN DISP ENDOPCH

## (undated) DEVICE — SOLUTION ANTIFOG VIS SYS CLEARIFY LAPSCP

## (undated) DEVICE — SKIN AFFIX SURG ADHESIVE 72/CS 0.55ML: Brand: MEDLINE

## (undated) DEVICE — GLOVE SURG SZ 75 L12IN FNGR THK87MIL DK GRN LTX FREE ISOLEX

## (undated) DEVICE — PENCIL ES L3M BTTN SWCH HOLSTER W/ BLDE ELECTRD EDGE

## (undated) DEVICE — Device

## (undated) DEVICE — TROCARS: Brand: KII® BALLOON BLUNT TIP SYSTEM

## (undated) DEVICE — SUTURE SZ 0 27IN 5/8 CIR UR-6  TAPER PT VIOLET ABSRB VICRYL J603H

## (undated) DEVICE — SOLUTION IV IRRIG POUR BRL 0.9% SODIUM CHL 2F7124

## (undated) DEVICE — SUTURE MCRYL SZ 4-0 L27IN ABSRB UD L19MM PS-2 1/2 CIR PRIM Y426H

## (undated) DEVICE — Z INACTIVE USE 2660664 SOLUTION IRRIG 3000ML 0.9% SOD CHL USP UROMATIC PLAS CONT

## (undated) DEVICE — DISCONTINUED USE 397741 LIGASURE 5MM BLUNT TIP

## (undated) DEVICE — HYPODERMIC SAFETY NEEDLE: Brand: MAGELLAN